# Patient Record
Sex: FEMALE | Race: BLACK OR AFRICAN AMERICAN | NOT HISPANIC OR LATINO | URBAN - METROPOLITAN AREA
[De-identification: names, ages, dates, MRNs, and addresses within clinical notes are randomized per-mention and may not be internally consistent; named-entity substitution may affect disease eponyms.]

---

## 2022-10-05 ENCOUNTER — EMERGENCY (EMERGENCY)
Facility: HOSPITAL | Age: 57
LOS: 1 days | Discharge: HOME | End: 2022-10-05
Attending: EMERGENCY MEDICINE | Admitting: EMERGENCY MEDICINE

## 2022-10-05 VITALS
RESPIRATION RATE: 16 BRPM | SYSTOLIC BLOOD PRESSURE: 161 MMHG | TEMPERATURE: 98 F | DIASTOLIC BLOOD PRESSURE: 96 MMHG | HEART RATE: 63 BPM | OXYGEN SATURATION: 100 %

## 2022-10-05 LAB
APPEARANCE UR: CLEAR — SIGNIFICANT CHANGE UP
BILIRUB UR-MCNC: NEGATIVE — SIGNIFICANT CHANGE UP
COLOR SPEC: SIGNIFICANT CHANGE UP
DIFF PNL FLD: NEGATIVE — SIGNIFICANT CHANGE UP
GLUCOSE UR QL: NEGATIVE — SIGNIFICANT CHANGE UP
KETONES UR-MCNC: NEGATIVE — SIGNIFICANT CHANGE UP
LEUKOCYTE ESTERASE UR-ACNC: NEGATIVE — SIGNIFICANT CHANGE UP
NITRITE UR-MCNC: NEGATIVE — SIGNIFICANT CHANGE UP
PH UR: 6 — SIGNIFICANT CHANGE UP (ref 5–8)
PROT UR-MCNC: NEGATIVE — SIGNIFICANT CHANGE UP
SP GR SPEC: 1.02 — SIGNIFICANT CHANGE UP (ref 1.01–1.05)
UROBILINOGEN FLD QL: SIGNIFICANT CHANGE UP

## 2022-10-05 PROCEDURE — 99284 EMERGENCY DEPT VISIT MOD MDM: CPT

## 2022-10-05 RX ORDER — CEPHALEXIN 500 MG
500 CAPSULE ORAL ONCE
Refills: 0 | Status: COMPLETED | OUTPATIENT
Start: 2022-10-05 | End: 2022-10-05

## 2022-10-05 RX ORDER — CEPHALEXIN 500 MG
1 CAPSULE ORAL
Qty: 28 | Refills: 0
Start: 2022-10-05 | End: 2022-10-11

## 2022-10-05 RX ORDER — HYDROCORTISONE 1 %
1 OINTMENT (GRAM) TOPICAL
Qty: 1 | Refills: 0
Start: 2022-10-05 | End: 2022-10-14

## 2022-10-05 RX ORDER — HYDROCORTISONE 1 %
1 OINTMENT (GRAM) TOPICAL ONCE
Refills: 0 | Status: COMPLETED | OUTPATIENT
Start: 2022-10-05 | End: 2022-10-05

## 2022-10-05 RX ORDER — IBUPROFEN 200 MG
400 TABLET ORAL ONCE
Refills: 0 | Status: COMPLETED | OUTPATIENT
Start: 2022-10-05 | End: 2022-10-05

## 2022-10-05 RX ADMIN — Medication 1 APPLICATION(S): at 19:23

## 2022-10-05 RX ADMIN — Medication 500 MILLIGRAM(S): at 19:07

## 2022-10-05 RX ADMIN — Medication 400 MILLIGRAM(S): at 19:10

## 2022-10-05 NOTE — ED PROVIDER NOTE - OBJECTIVE STATEMENT
58 y/o female with no significant 56 y/o female with no significant PMHx presents to the ER c/o rash to right sided hip/abdomen area that started 2 days ago.  Pt states rash was initially itchy and is now painful.  Pt c/o 1 day of dysuria.  Pt denies fevers, chills, nausea, vomiting, diarrhea, weakness, dizziness.  Pt reports similar rash in the past to her leg which was treated with antibiotics and resolved.

## 2022-10-05 NOTE — ED PROVIDER NOTE - NSFOLLOWUPINSTRUCTIONS_ED_ALL_ED_FT
Follow up with your Primary Medical Doctor in 1-2 days.  Take Keflex 500mg one tablet orally 4 x a day x 7 days.  Apply Hydrocortisone 1%cream to affected area 2 x a day x 7 days.  Take Ibuprofen 400mg orally every 6 hours as needed for pain take with food.  Return to the ER for any persistent/worsening or new symptoms fevers, chills, worsening rash, weakness, dizziness or any concerning symptoms.

## 2022-10-05 NOTE — ED PROVIDER NOTE - CLINICAL SUMMARY MEDICAL DECISION MAKING FREE TEXT BOX
56 y/o female with no significant PMHx presents to the ER c/o rash to right sided hip/abdomen area that started 2 days ago.  Pt states rash was initially itchy and is now painful.  Pt c/o 1 day of dysuria. Pt is well appearing, NAD, area of erythema noted will tx with Keflex, Hydrocortisone cream, UA sent, follow up PMD.

## 2022-10-05 NOTE — ED PROVIDER NOTE - ATTENDING APP SHARED VISIT CONTRIBUTION OF CARE
Dr. Bentley:  I performed a face to face bedside interview with patient regarding history of present illness, review of symptoms and past medical history. I completed an independent physical exam.  I have discussed patient's plan of care with PA.   I agree with note as stated above, having amended the EMR as needed to reflect my findings.   This includes HISTORY OF PRESENT ILLNESS, HIV, PAST MEDICAL/SURGICAL/FAMILY/SOCIAL HISTORY, ALLERGIES AND HOME MEDICATIONS, REVIEW OF SYSTEMS, PHYSICAL EXAM, and any PROGRESS NOTES during the time I functioned as the attending physician for this patient.    57F presents with irritation/pain to right side just above hip with some redness to the skin.  Denies fever and other constitutional symptoms.      Exam:  - nad  - +patch of erythema on skin over right side above the hip, no vesicles visualized    A/P  - likely cellulitis, will cover with abx  - counseled patient to keep an eye out for developing vesicular rash as may be prodromal shingles

## 2022-10-05 NOTE — ED PROVIDER NOTE - PATIENT PORTAL LINK FT
You can access the FollowMyHealth Patient Portal offered by [facility] by registering at the following website: [email or web address]. By joining [employer]’s FollowMyHealth portal, you will also be able to view your health information using other applications (apps) compatible with our system.

## 2022-10-05 NOTE — ED PROVIDER NOTE - PHYSICAL EXAMINATION
small area of erythema noted just superior to left lateral hip, warm to touch, no vesicles, no pustules.

## 2022-10-06 LAB
CULTURE RESULTS: SIGNIFICANT CHANGE UP
SPECIMEN SOURCE: SIGNIFICANT CHANGE UP